# Patient Record
Sex: MALE | Race: WHITE | Employment: FULL TIME | URBAN - NONMETROPOLITAN AREA
[De-identification: names, ages, dates, MRNs, and addresses within clinical notes are randomized per-mention and may not be internally consistent; named-entity substitution may affect disease eponyms.]

---

## 2017-12-11 ENCOUNTER — HOSPITAL ENCOUNTER (EMERGENCY)
Facility: HOSPITAL | Age: 37
Discharge: HOME | End: 2017-12-11
Attending: PHYSICIAN ASSISTANT | Admitting: PHYSICIAN ASSISTANT
Payer: COMMERCIAL

## 2017-12-11 ENCOUNTER — APPOINTMENT (OUTPATIENT)
Dept: CT IMAGING | Facility: HOSPITAL | Age: 37
End: 2017-12-11
Attending: PHYSICIAN ASSISTANT
Payer: COMMERCIAL

## 2017-12-11 VITALS
TEMPERATURE: 98 F | HEART RATE: 81 BPM | OXYGEN SATURATION: 98 % | SYSTOLIC BLOOD PRESSURE: 120 MMHG | DIASTOLIC BLOOD PRESSURE: 79 MMHG | RESPIRATION RATE: 16 BRPM

## 2017-12-11 DIAGNOSIS — R19.7 DIARRHEA, UNSPECIFIED TYPE: ICD-10-CM

## 2017-12-11 DIAGNOSIS — K64.4 EXTERNAL HEMORRHOIDS: ICD-10-CM

## 2017-12-11 DIAGNOSIS — R10.84 ABDOMINAL PAIN, GENERALIZED: ICD-10-CM

## 2017-12-11 LAB
ALBUMIN SERPL-MCNC: 4.5 G/DL (ref 3.4–5)
ALP SERPL-CCNC: 81 U/L (ref 40–150)
ALT SERPL W P-5'-P-CCNC: 25 U/L (ref 0–70)
ANION GAP SERPL CALCULATED.3IONS-SCNC: 10 MMOL/L (ref 3–14)
AST SERPL W P-5'-P-CCNC: 17 U/L (ref 0–45)
BASOPHILS # BLD AUTO: 0 10E9/L (ref 0–0.2)
BASOPHILS NFR BLD AUTO: 0.4 %
BILIRUB SERPL-MCNC: 1.2 MG/DL (ref 0.2–1.3)
BUN SERPL-MCNC: 23 MG/DL (ref 7–30)
CALCIUM SERPL-MCNC: 9.3 MG/DL (ref 8.5–10.1)
CHLORIDE SERPL-SCNC: 102 MMOL/L (ref 94–109)
CO2 SERPL-SCNC: 24 MMOL/L (ref 20–32)
CREAT SERPL-MCNC: 0.89 MG/DL (ref 0.66–1.25)
CRP SERPL-MCNC: <2.9 MG/L (ref 0–8)
DIFFERENTIAL METHOD BLD: ABNORMAL
EOSINOPHIL # BLD AUTO: 0.1 10E9/L (ref 0–0.7)
EOSINOPHIL NFR BLD AUTO: 0.4 %
ERYTHROCYTE [DISTWIDTH] IN BLOOD BY AUTOMATED COUNT: 12.2 % (ref 10–15)
GFR SERPL CREATININE-BSD FRML MDRD: >90 ML/MIN/1.7M2
GLUCOSE SERPL-MCNC: 112 MG/DL (ref 70–99)
HCT VFR BLD AUTO: 49.4 % (ref 40–53)
HEMOCCULT STL QL IA: NEGATIVE
HGB BLD-MCNC: 17.4 G/DL (ref 13.3–17.7)
IMM GRANULOCYTES # BLD: 0 10E9/L (ref 0–0.4)
IMM GRANULOCYTES NFR BLD: 0.4 %
LIPASE SERPL-CCNC: 120 U/L (ref 73–393)
LYMPHOCYTES # BLD AUTO: 3.4 10E9/L (ref 0.8–5.3)
LYMPHOCYTES NFR BLD AUTO: 30.6 %
MCH RBC QN AUTO: 32 PG (ref 26.5–33)
MCHC RBC AUTO-ENTMCNC: 35.2 G/DL (ref 31.5–36.5)
MCV RBC AUTO: 91 FL (ref 78–100)
MONOCYTES # BLD AUTO: 0.9 10E9/L (ref 0–1.3)
MONOCYTES NFR BLD AUTO: 8.3 %
NEUTROPHILS # BLD AUTO: 6.7 10E9/L (ref 1.6–8.3)
NEUTROPHILS NFR BLD AUTO: 59.9 %
NRBC # BLD AUTO: 0 10*3/UL
NRBC BLD AUTO-RTO: 0 /100
PLATELET # BLD AUTO: 227 10E9/L (ref 150–450)
POTASSIUM SERPL-SCNC: 3.6 MMOL/L (ref 3.4–5.3)
PROT SERPL-MCNC: 7.8 G/DL (ref 6.8–8.8)
RBC # BLD AUTO: 5.43 10E12/L (ref 4.4–5.9)
SODIUM SERPL-SCNC: 136 MMOL/L (ref 133–144)
WBC # BLD AUTO: 11.2 10E9/L (ref 4–11)

## 2017-12-11 PROCEDURE — 25000128 H RX IP 250 OP 636: Performed by: RADIOLOGY

## 2017-12-11 PROCEDURE — 74177 CT ABD & PELVIS W/CONTRAST: CPT | Mod: TC

## 2017-12-11 PROCEDURE — 85025 COMPLETE CBC W/AUTO DIFF WBC: CPT | Performed by: PHYSICIAN ASSISTANT

## 2017-12-11 PROCEDURE — 96361 HYDRATE IV INFUSION ADD-ON: CPT

## 2017-12-11 PROCEDURE — 25000128 H RX IP 250 OP 636: Performed by: PHYSICIAN ASSISTANT

## 2017-12-11 PROCEDURE — 86140 C-REACTIVE PROTEIN: CPT | Performed by: PHYSICIAN ASSISTANT

## 2017-12-11 PROCEDURE — 80053 COMPREHEN METABOLIC PANEL: CPT | Performed by: PHYSICIAN ASSISTANT

## 2017-12-11 PROCEDURE — 83690 ASSAY OF LIPASE: CPT | Performed by: PHYSICIAN ASSISTANT

## 2017-12-11 PROCEDURE — 36415 COLL VENOUS BLD VENIPUNCTURE: CPT | Performed by: PHYSICIAN ASSISTANT

## 2017-12-11 PROCEDURE — 82274 ASSAY TEST FOR BLOOD FECAL: CPT | Performed by: PHYSICIAN ASSISTANT

## 2017-12-11 PROCEDURE — 99284 EMERGENCY DEPT VISIT MOD MDM: CPT | Performed by: PHYSICIAN ASSISTANT

## 2017-12-11 PROCEDURE — 99285 EMERGENCY DEPT VISIT HI MDM: CPT | Mod: 25

## 2017-12-11 PROCEDURE — 96374 THER/PROPH/DIAG INJ IV PUSH: CPT | Mod: 59

## 2017-12-11 RX ORDER — ONDANSETRON 4 MG/1
4 TABLET, ORALLY DISINTEGRATING ORAL EVERY 8 HOURS PRN
Qty: 10 TABLET | Refills: 0 | Status: SHIPPED | OUTPATIENT
Start: 2017-12-11 | End: 2017-12-14

## 2017-12-11 RX ORDER — ONDANSETRON 2 MG/ML
4 INJECTION INTRAMUSCULAR; INTRAVENOUS
Status: COMPLETED | OUTPATIENT
Start: 2017-12-11 | End: 2017-12-11

## 2017-12-11 RX ORDER — IOPAMIDOL 612 MG/ML
100 INJECTION, SOLUTION INTRAVASCULAR ONCE
Status: COMPLETED | OUTPATIENT
Start: 2017-12-11 | End: 2017-12-11

## 2017-12-11 RX ADMIN — IOPAMIDOL 100 ML: 612 INJECTION, SOLUTION INTRAVENOUS at 12:49

## 2017-12-11 RX ADMIN — SODIUM CHLORIDE 1000 ML: 9 INJECTION, SOLUTION INTRAVENOUS at 11:57

## 2017-12-11 RX ADMIN — ONDANSETRON 4 MG: 2 INJECTION INTRAMUSCULAR; INTRAVENOUS at 11:57

## 2017-12-11 ASSESSMENT — ENCOUNTER SYMPTOMS
DIARRHEA: 0
ABDOMINAL PAIN: 1
FEVER: 0
NEUROLOGICAL NEGATIVE: 1
NAUSEA: 1
FATIGUE: 1
CHILLS: 0
HEMATURIA: 0
FLANK PAIN: 0
VOMITING: 0
DIFFICULTY URINATING: 0
CARDIOVASCULAR NEGATIVE: 1
PSYCHIATRIC NEGATIVE: 1
FREQUENCY: 0
RECTAL PAIN: 1
DYSURIA: 0
BACK PAIN: 0

## 2017-12-11 NOTE — ED NOTES
"Pt ambulatory with a cane to ED room 1. Pt has c/o rectal pain. Pt states that he has a rectal hernia and \"now there is something sticking out of my rectum.\" Pt also c/o nausea and LLQ pain. Pt denies vomiting. Last BM was approx 2 days ago. Pt denies dysuria  "

## 2017-12-11 NOTE — DISCHARGE INSTRUCTIONS
"- Start with topical for hemorrhoid. This will help with swelling and pain.   - Sitz baths\" essentially soaking for 20 minutes in warm bath water  - FOLLOW UP with primary care to recheck and possibly set you up for further imaging. The CT scan is negative today.   - Monitor your symptoms to check for any triggers or patterns. Return here prior to family practice with any concern for worsening.     What to expect when you have contrast    During your exam, we will inject  contrast  into your vein or artery. (Contrast is a clear liquid with iodine in it. It shows up on X-rays.)    You may feel warm or hot. You may have a metal taste in your mouth and a slight upset stomach. You may also feel pressure near the kidneys and bladder. These effects will last about 1 to 3 minutes.    Please tell us if you have:    Sneezing     Itching    Hives     Swelling in the face    A hoarse voice    Breathing problems    Other new symptoms    Serious problems are rare.  They may include:    Irregular heartbeat     Seizures    Kidney failure              Tissue damage    Shock      Death    If you have any problems during the exam, we  will treat them right away.    When you get home    Call your hospital if you have any new symptoms in the next 2 days, like hives or swelling. (Phone numbers are at the bottom of this page.) Or call your family doctor.     If you have wheezing or trouble breathing, call 911.    Self-care  -Drink at least 4 extra glasses of water today.   This reduces the stress on your kidneys.  -Keep taking your regular medicines.    The contrast will pass out of your body in your  Urine(pee). This will happen in the next 24 hours. You  will not feel this. Your urine will not  change color.    If you have kidney problems or take metformin    Drink 4 to 8 large glasses of water for the next  2 days, if you are not on a fluid restriction.    ?If you take metformin (Glucophage or Glucovance) for diabetes, keep taking " it.      ?Your kidney function tests are abnormal.  If you take Metformin, do not take it for 48 hours. Please go to your clinic for a blood test within 3 days after your exam before the restarting this medicine.     (Note to provider:please give patient prescription for lab tests.)    ?Special instructions: drink four extra glasses of water today    I have read and understand the above information.    Patient Sign Here:______________________________________Date:________Time:______    Staff Sign Here:________________________________________Date:_______Time:______      Radiology Departments:     ?[facility]: [phone] ?[facility]: [phone]     ?[facility]: [phone] ?[facility]:[phone]      ?[facility]: [phone]  ?[facility]: [phone]  ?[place]:[phone]    ?[facility]:[phone]  ?[facility]:[phone]

## 2017-12-11 NOTE — ED NOTES
Arrived to triage with c/o pain in rectum for 2 years. States has been unable to see a doctor for it. States he googled all of his symptoms last night and it all points to rectal hernia. Denies fevers. States he is wasting away due to this. Rates pain 9/10.

## 2017-12-11 NOTE — LETTER
[address] [address]  Main: [phone]  Dept: [phone]      December 11, 2017      Re: Patel Anderson      TO WHOM IT MAY CONCERN:    Patel Anderson was seen in the emergency department today for acute illness. He has started treatment. I expect his condition to improve over the next 2-5 days. He may return to work when symptoms have improved.          Sincerely,      Pelon Figueroa PA-C   12/11/2017   1:41 PM

## 2017-12-11 NOTE — ED NOTES
Discharge instructions reviewed with patient, and patient verbalized understanding. Rx x2 sent to [facility]. Pt ambulated with a steady gait to the exit.

## 2017-12-11 NOTE — ED PROVIDER NOTES
History     Chief Complaint   Patient presents with     Hernia     states has had rectal hernia for 2 years and now it is causing pain.     The history is provided by the patient. No  was used.     Patel Anderson is a 37 year old male who presents with worsening abdominal pain over the past week.  He states he has been dealing with GI symptoms for almost 2 years and reports weight loss of 80 lbs over this time. Today he has hemorrhoid that the thinks may or may not be related to his belly pain and recent diarrhea. Worsening pain prompting visit today. He is also concerned as he has been tired and it is tough with the hemorrhoid pain to get through his second job past few days. In addition, he reports early satiety with meals. He is nauseated at times, but denies vomiting. He denies blood in vomit and no bloody or black stools. No LUTS. He has not had a surgery.     Problem List:    There are no active problems to display for this patient.       Past Medical History:    No past medical history on file.    Past Surgical History:    No past surgical history on file.    Family History:    No family history on file.    Social History:  Marital Status:   [2]  Social History   Substance Use Topics     Smoking status: Not on file     Smokeless tobacco: Not on file     Alcohol use Not on file        Medications:      hydrocortisone (ANUSOL-HC) 2.5 % cream   ondansetron (ZOFRAN ODT) 4 MG ODT tab         Review of Systems   Constitutional: Positive for fatigue. Negative for chills and fever.   Cardiovascular: Negative.    Gastrointestinal: Positive for abdominal pain, nausea and rectal pain. Negative for diarrhea (resolved) and vomiting.   Genitourinary: Negative for difficulty urinating, dysuria, flank pain, frequency, hematuria and urgency.   Musculoskeletal: Negative for back pain.   Skin: Negative.    Neurological: Negative.    Psychiatric/Behavioral: Negative.        Physical Exam   BP:  134/95  Pulse: 81  Heart Rate: (!) 122  Temp: 98  F (36.7  C)  Resp: 17  SpO2: 96 %      Physical Exam   Constitutional: He is oriented to person, place, and time. He appears well-developed and well-nourished. No distress (pt is thin appearing).   HENT:   Head: Normocephalic and atraumatic.   Right Ear: External ear normal.   Left Ear: External ear normal.   Mouth/Throat: Oropharynx is clear and moist.   Eyes: Conjunctivae are normal.   Cardiovascular: Normal heart sounds.    No murmur heard.  Pulmonary/Chest: Effort normal and breath sounds normal.   Abdominal: Soft. Bowel sounds are normal. There is tenderness.       Genitourinary: Rectal exam shows external hemorrhoid. Rectal exam shows no mass and anal tone normal.   Neurological: He is alert and oriented to person, place, and time.   Skin: Skin is warm and dry.   Psychiatric: He has a normal mood and affect.   Nursing note and vitals reviewed.      ED Course     ED Course     Procedures      Labs Ordered and Resulted from Time of ED Arrival Up to the Time of Departure from the ED   CBC WITH PLATELETS DIFFERENTIAL - Abnormal; Notable for the following:        Result Value    WBC 11.2 (*)     All other components within normal limits   COMPREHENSIVE METABOLIC PANEL - Abnormal; Notable for the following:     Glucose 112 (*)     All other components within normal limits   LIPASE   CRP INFLAMMATION   OCCULT BLOOD FECAL HGB IMMUNO     PROCEDURE: CT ABDOMEN PELVIS W CONTRAST 12/11/2017 12:57 PM     HISTORY: abdominal pain, weight loss;      COMPARISONS: None.     Meds/Dose Given: ISOVUE 300 / 100ml     TECHNIQUE: CT scan of the abdomen and pelvis with IV contrast.  Sagittal coronal reconstructions were obtained     FINDINGS: Lung bases are clear. Is a small cysts in height in the dome  of the liver on the right. No calcified gallstones are noted. There is  no biliary ductal enlargement. Spleen and pancreas appear normal.  There are no adrenal masses. The right left  "kidneys show no evidence  of masses or hydronephrosis. The periaortic lymph nodes are normal in  caliber. In the pelvis the bladder prostate and rectum appear normal.  No pelvic masses or fluid collections are noted. Intraperitoneally  there are no masses or inflammatory changes.          IMPRESSION: Negative CT scan of the abdomen and pelvis aside from a  small cyst in the liver     VALENTINA MENA MD    Medications   ondansetron (ZOFRAN) injection 4 mg (4 mg Intravenous Given 12/11/17 1157)   0.9% sodium chloride BOLUS (0 mLs Intravenous Stopped 12/11/17 1332)   iopamidol (ISOVUE-300) IV solution 61% 100 mL (100 mLs Intravenous Given 12/11/17 1249)   sodium chloride (PF) 0.9% PF flush 60 mL (60 mLs Intravenous Given 12/11/17 1249)       Assessments & Plan (with Medical Decision Making)     I have reviewed the nursing notes.  I have reviewed the findings, diagnosis, plan and need for follow up with the patient.    Discharge Medication List as of 12/11/2017  1:40 PM      START taking these medications    Details   hydrocortisone (ANUSOL-HC) 2.5 % cream Place rectally 2 times daily for 10 daysDisp-30 g, [id number]      ondansetron (ZOFRAN ODT) 4 MG ODT tab Take 1 tablet (4 mg) by mouth every 8 hours as needed for nausea, Disp-10 tablet, R-0, E-Prescribe             Final diagnoses:   Diarrhea, unspecified type   External hemorrhoids   Abdominal pain, generalized   Due to the diarrhea, abdominal pain and weight loss a CT is ordered and negative. He does have obvious hemorrhoid on exam and will start with conservative treatment as above. He adds that the zofran has \"helped me feel hungry for the first time in a while\", so this is prescribed for short term use until he can see his primary for follow up Friday/Monday. I stressed the importance of f/u and discussed differentials with patient considering his reported Hx. He is agreeable to plan. Will return here with any persistent diarrhea or vomiting, bloody stools, " fevers, concern for worsening despite treatment.     12/11/2017   [facility]     Pelon Figueroa, PA  12/12/17 0115

## 2017-12-11 NOTE — ED AVS SNAPSHOT
[facility]    [address] [address]    Phone:  [phone]                                       Patel Anderson   MRN: [id number]    Department:  [facility]   Date of Visit:  12/11/2017           After Visit Summary Signature Page     I have received my discharge instructions, and my questions have been answered. I have discussed any challenges I see with this plan with the nurse or doctor.    ..........................................................................................................................................  Patient/Patient Representative Signature      ..........................................................................................................................................  Patient Representative Print Name and Relationship to Patient    ..................................................               ................................................  Date                                            Time    ..........................................................................................................................................  Reviewed by Signature/Title    ...................................................              ..............................................  Date                                                            Time

## 2017-12-11 NOTE — ED AVS SNAPSHOT
[facility]    [address] [address]    Phone:  [phone]                                       Patel Anderson   MRN: [id number]    Department:  [facility]   Date of Visit:  12/11/2017           Patient Information     Date Of Birth          1980        Your diagnoses for this visit were:     Diarrhea, unspecified type     External hemorrhoids     Abdominal pain, generalized        You were seen by Pelon Figueroa PA.      Follow-up Information     Follow up with Scooby Keane MD.    Specialty:  Family Practice    Why:  3-5 days    Contact information:    [facility]  [address] [address]  [phone]          Follow up with [facility].    Specialty:  EMERGENCY MEDICINE    Why:  If symptoms worsen    Contact information:    [address] [address] [phone]    Additional information:    From [place]: Take [address]. Turn left at [address]. Turn left at the first stoplight on [address] [place] [place] [address]. At the first stop sign, take a right onto [address]. Take a left into the parking lot and continue through until you reach the North enterance of the building.       From [place]: Take [address]. Take the [address] ramp towards [place]. Turn left onto [address]. Take a slight right onto [address]. Turn left at the first stoplight on [address]. At the first stop sign, take a right onto [address]. Take a left into the parking lot and continue through until you reach the North enterance of the building.       From [place]: Take [address]. Take a right at [address]. At the first stop sign, take a right onto [address]. Take a left into the parking lot and continue through until you reach the North enterance of the building.         Discharge Instructions       - Start with topical for hemorrhoid. This will help  "with swelling and pain.   - Sitz baths\" essentially soaking for 20 minutes in warm bath water  - FOLLOW UP with primary care to recheck and possibly set you up for further imaging. The CT scan is negative today.   - Monitor your symptoms to check for any triggers or patterns. Return here prior to family practice with any concern for worsening.     What to expect when you have contrast    During your exam, we will inject  contrast  into your vein or artery. (Contrast is a clear liquid with iodine in it. It shows up on X-rays.)    You may feel warm or hot. You may have a metal taste in your mouth and a slight upset stomach. You may also feel pressure near the kidneys and bladder. These effects will last about 1 to 3 minutes.    Please tell us if you have:    Sneezing     Itching    Hives     Swelling in the face    A hoarse voice    Breathing problems    Other new symptoms    Serious problems are rare.  They may include:    Irregular heartbeat     Seizures    Kidney failure              Tissue damage    Shock      Death    If you have any problems during the exam, we  will treat them right away.    When you get home    Call your hospital if you have any new symptoms in the next 2 days, like hives or swelling. (Phone numbers are at the bottom of this page.) Or call your family doctor.     If you have wheezing or trouble breathing, call 911.    Self-care  -Drink at least 4 extra glasses of water today.   This reduces the stress on your kidneys.  -Keep taking your regular medicines.    The contrast will pass out of your body in your  Urine(pee). This will happen in the next 24 hours. You  will not feel this. Your urine will not  change color.    If you have kidney problems or take metformin    Drink 4 to 8 large glasses of water for the next  2 days, if you are not on a fluid restriction.    ?If you take metformin (Glucophage or Glucovance) for diabetes, keep taking it.      ?Your kidney function tests are abnormal.  If " you take Metformin, do not take it for 48 hours. Please go to your clinic for a blood test within 3 days after your exam before the restarting this medicine.     (Note to provider:please give patient prescription for lab tests.)    ?Special instructions: drink four extra glasses of water today    I have read and understand the above information.    Patient Sign Here:______________________________________Date:________Time:______    Staff Sign Here:________________________________________Date:_______Time:______      Radiology Departments:     ?[facility]: [phone] ?[facility]: [phone]     ?[facility]: [phone] ?[facility]:[phone]      ?[place]: [phone]  ?[facility]: [phone]  ?[facility]:[phone]    ?[facility]:[phone]  ?[facility]:[phone]    Discharge References/Attachments     HEMORRHOIDS (ENGLISH)    ABDOMINAL PAIN, UNKOWN CAUSE, (MALE) (ENGLISH)         Review of your medicines      START taking        Dose / Directions Last dose taken    hydrocortisone 2.5 % cream   Commonly known as:  ANUSOL-HC   Quantity:  30 g        Place rectally 2 times daily for 10 days   Refills:  0        ondansetron 4 MG ODT tab   Commonly known as:  ZOFRAN ODT   Dose:  4 mg   Quantity:  10 tablet        Take 1 tablet (4 mg) by mouth every 8 hours as needed for nausea   Refills:  0                Prescriptions were sent or printed at these locations (2 Prescriptions)                   [id number] - [place] - [address] [id number] [address] [id number]    Telephone:  [phone]   Fax:  [phone]   Hours:                  E-Prescribed (2 of 2)         hydrocortisone (ANUSOL-HC) 2.5 % cream               ondansetron (ZOFRAN ODT) 4 MG ODT tab                Procedures and tests performed during your visit     CBC with platelets differential    CRP inflammation    CT Abdomen Pelvis w Contrast    Comprehensive metabolic panel    Lipase    Occult blood fecal HGB immuno      Orders  "Needing Specimen Collection     Ordered          17 1142  Clostridium difficile toxin B PCR - ROUTINE, Prio: Routine, Needs to be Collected     Scheduled Task Status   17 1143 Collect Clostridium difficile toxin B PCR Open   Order Class:  PCU Collect                17 1143  UA reflex to Microscopic - STAT, Prio: STAT, Needs to be Collected     Scheduled Task Status   17 1143 Collect UA reflex to Microscopic Open   Order Class:  PCU Collect                  Pending Results     No orders found from 2017 to 2017.            Pending Culture Results     No orders found from 2017 to 2017.            Thank you for choosing [facility]       Thank you for choosing [facility] for your care. Our goal is always to provide you with excellent care. Hearing back from our patients is one way we can continue to improve our services. Please take a few minutes to complete the written survey that you may receive in the mail after you visit with us. Thank you!        [facility] Information     [facility] lets you send messages to your doctor, view your test results, renew your prescriptions, schedule appointments and more. To sign up, go to [email or web address] . Click on \"Log in\" on the left side of the screen, which will take you to the Welcome page. Then click on \"Sign up Now\" on the right side of the page.     You will be asked to enter the access code listed below, as well as some personal information. Please follow the directions to create your username and password.     Your access code is: [email or web address]  Expires: 3/11/2018  1:32 PM     Your access code will  in 90 days. If you need help or a new code, please call your [facility] clinic or [phone].        Care EveryWhere ID     This is your Care EveryWhere ID. This could be used by other organizations to access your [facility] medical records  [id number]        Equal Access to Services     CHEIKH JOSHUA: Carey archuleta " adam Bull, nelia liceamaaiden maradiaga. So [facility] [phone].    ATENCIÓN: Si habla español, tiene a zamudio disposición servicios gratuitos de asistencia lingüística. Llame al [phone].    We comply with applicable federal civil rights laws and [place] laws. We do not discriminate on the basis of race, color, national origin, age, disability, sex, sexual orientation, or gender identity.            After Visit Summary       This is your record. Keep this with you and show to your community pharmacist(s) and doctor(s) at your next visit.

## 2017-12-14 ENCOUNTER — TRANSFERRED RECORDS (OUTPATIENT)
Dept: HEALTH INFORMATION MANAGEMENT | Facility: HOSPITAL | Age: 37
End: 2017-12-14

## 2017-12-14 LAB
HBA1C MFR BLD: 5.4 % (ref 4–6)
TSH SERPL-ACNC: 1.63 MIU/ML (ref 0.35–4.8)

## 2017-12-19 ENCOUNTER — OFFICE VISIT (OUTPATIENT)
Dept: SURGERY | Facility: OTHER | Age: 37
End: 2017-12-19
Attending: SURGERY
Payer: COMMERCIAL

## 2017-12-19 VITALS
BODY MASS INDEX: 19.46 KG/M2 | HEART RATE: 76 BPM | SYSTOLIC BLOOD PRESSURE: 116 MMHG | DIASTOLIC BLOOD PRESSURE: 70 MMHG | HEIGHT: 69 IN | OXYGEN SATURATION: 96 % | WEIGHT: 131.4 LBS | TEMPERATURE: 97.8 F

## 2017-12-19 DIAGNOSIS — R63.4 LOSS OF WEIGHT: ICD-10-CM

## 2017-12-19 DIAGNOSIS — N41.0 ACUTE PROSTATITIS: Primary | ICD-10-CM

## 2017-12-19 DIAGNOSIS — Z01.818 ENCOUNTER FOR PREOPERATIVE EXAMINATION FOR GENERAL SURGICAL PROCEDURE: ICD-10-CM

## 2017-12-19 DIAGNOSIS — R10.32 ABDOMINAL PAIN, LEFT LOWER QUADRANT: ICD-10-CM

## 2017-12-19 LAB
ALBUMIN UR-MCNC: 10 MG/DL
APPEARANCE UR: ABNORMAL
BACTERIA #/AREA URNS HPF: ABNORMAL /HPF
BILIRUB UR QL STRIP: NEGATIVE
COLOR UR AUTO: YELLOW
GLUCOSE UR STRIP-MCNC: NEGATIVE MG/DL
HGB UR QL STRIP: NEGATIVE
KETONES UR STRIP-MCNC: NEGATIVE MG/DL
LEUKOCYTE ESTERASE UR QL STRIP: NEGATIVE
MUCOUS THREADS #/AREA URNS LPF: PRESENT /LPF
NITRATE UR QL: NEGATIVE
PH UR STRIP: 5 PH (ref 4.7–8)
PSA SERPL-ACNC: 0.5 UG/L (ref 0–4)
RBC #/AREA URNS AUTO: 3 /HPF (ref 0–2)
SOURCE: ABNORMAL
SP GR UR STRIP: 1.02 (ref 1–1.03)
UROBILINOGEN UR STRIP-MCNC: NORMAL MG/DL (ref 0–2)
WBC #/AREA URNS AUTO: 1 /HPF (ref 0–2)

## 2017-12-19 PROCEDURE — 81001 URINALYSIS AUTO W/SCOPE: CPT | Mod: ZL | Performed by: SURGERY

## 2017-12-19 PROCEDURE — 93005 ELECTROCARDIOGRAM TRACING: CPT

## 2017-12-19 PROCEDURE — G0103 PSA SCREENING: HCPCS | Mod: ZL | Performed by: SURGERY

## 2017-12-19 PROCEDURE — 99213 OFFICE O/P EST LOW 20 MIN: CPT

## 2017-12-19 PROCEDURE — 99204 OFFICE O/P NEW MOD 45 MIN: CPT | Performed by: SURGERY

## 2017-12-19 PROCEDURE — 93010 ELECTROCARDIOGRAM REPORT: CPT | Performed by: INTERNAL MEDICINE

## 2017-12-19 RX ORDER — SULFAMETHOXAZOLE/TRIMETHOPRIM 800-160 MG
1 TABLET ORAL 2 TIMES DAILY
Qty: 20 TABLET | Refills: 0 | Status: SHIPPED | OUTPATIENT
Start: 2017-12-19 | End: 2020-09-19

## 2017-12-19 RX ORDER — ONDANSETRON 4 MG/1
4 TABLET, FILM COATED ORAL EVERY 8 HOURS PRN
COMMUNITY
End: 2020-09-19

## 2017-12-19 RX ORDER — BISACODYL 5 MG/1
TABLET, DELAYED RELEASE ORAL
Qty: 8 TABLET | Refills: 0 | Status: CANCELLED | OUTPATIENT
Start: 2017-12-19

## 2017-12-19 RX ORDER — POLYETHYLENE GLYCOL 3350 17 G/17G
POWDER, FOR SOLUTION ORAL
Qty: 1 BOTTLE | Refills: 0 | Status: CANCELLED | OUTPATIENT
Start: 2017-12-19

## 2017-12-19 ASSESSMENT — PAIN SCALES - GENERAL: PAINLEVEL: NO PAIN (0)

## 2017-12-19 NOTE — PATIENT INSTRUCTIONS
Your procedure will be at the [facility] in [place]  [address] [facility] [address]  [place] () [phone]  Fax Number [phone]      Thank you for allowing Dr. Sweeney and our surgical team to participate in your care.  If you have a scheduling or an appointment question please contact [facility] Health Unit Coordinator at her direct line [phone].   ALL nursing questions or concerns can be directed to Elsie at: [phone]     You are scheduled for a: colonoscopy and egd  Your procedure date is: 1/4/18    You need a friend or family member available to drive you home AND stay with you for 24 hours after you leave the hospital. You will not be allowed to drive yourself. IF you need to take a taxi or the bus you MUST have a responsible person to ride with you. YOUR PROCEDURE WILL BE CANCELLED IF YOU DO NOT HAVE A RESPONSIBLE ADULT TO DRIVE YOU HOME.       You CANNOT have anything to eat or drink after midnight the night before your surgery, ncluding water and coffee. Your stomach needs to be completely empty. Do NOT chew gum, suck on hard candy, or smoke. You can brush your teeth the morning of surgery.       You need to call our Surgery Education Nurses 1-2 weeks prior to your surgery date at  [phone] or toll free [phone]. Please have you medication and allergy lists ready.      Stop your aspirin or other NSAIDs(Ibuprofen, Motrin, Aleve, Celebrex, Naproxen, etc...) 7 days before your surgery.      Hospital admitting will call you the day before your surgery with your arrival time. If you are scheduled on a Monday admitting will call you the Friday before.      Please call your primary care physician if you should become ill within 24 hours of scheduled surgery. (ex.vomiting, diarrhea, fever)    Surgery Education will contact you the day before your procedure between the hours of noon and 5 pm with the time you need to register in admitting at the hospital. Call  Elsie with any questions [phone]  Hold all medications day of surgery and once you return home from your procedure you may resume all medications like normal.

## 2017-12-19 NOTE — PROGRESS NOTES
[facility] Surgery Consultation    CC:  Weight loss and abdominal pain    HPI:  This 37 year old year old male is seen at the request of Luna Castro for evaluation of abdominal pain and weight loss.  The history is obtained from the patient, and reviewing the medical record.  He is good medical historian. Mr. Anderson is a 37 year old male who presents with an 18 month history of weight loss and abdominal pain. He says that the pain is in the left lower quadrant. He has had nausea associated with the pain but no emesis. He has a history of constipation where he would go days without bowel movements and problems with hemorrhoids. He has noticed that recently he has had a bulge/hemorrhoid where he has had to push it back in in the recent past. However, more recently he has had problems with it popping out and would not go back in. He has noticed some blood on the toilet paper and in the bowel. He states that with the hemorrhoid he will have pain in the rectum. He was recently seen in the emergency room for his left lower quadrant abdominal pain. At that time he was evaluated with labs and imaging. He had a mild leukocytosis of 11.2 and his CT scan was within normal limits with no abnormalities noted. He has not food fear, he has no upper gastrointestinal symptoms, he typically will have one bowel movement a day.     No past medical history on file.    No past surgical history on file.    Pt denied problems with bleeding or anesthesia    Current Outpatient Prescriptions   Medication Sig Dispense Refill     ondansetron (ZOFRAN) 4 MG tablet Take 4 mg by mouth every 8 hours as needed for nausea       OMEPRAZOLE PO Take 40 mg by mouth every morning       hydrocortisone (ANUSOL-HC) 2.5 % cream Place rectally 2 times daily for 10 days 30 g 0        Not on File      HABITS:    Social History   Substance Use Topics     Smoking status: Not on file     Smokeless tobacco: Not on file     Alcohol use Not on file     No  "mood altering drug use.    No family history on file.    REVIEW OF SYSTEMS:  Ten point review of systems negative except those mentioned in the HPI.     The patient denies sleep apnea, latex allergies or MRSA    OBJECTIVE:    /70  Pulse 76  Temp 97.8  F (36.6  C) (Tympanic)  Ht 5' 9\" (1.753 m)  Wt 131 lb 6.4 oz (59.6 kg)  SpO2 96%  BMI 19.4 kg/m2    GENERAL: Generally appears cachectic, he is weak with depressed affect.  HEENT:   Sclerae anicteric - No cervical, supra/infraclavicular lymphadenopathy, no thyroid masses  Respiratory:  Lungs clear to ausculation bilaterally with good air excursion  Cardiovascular:  Regular Rate and Rhythm with no murmurs gallops or rubs, normal   Abdomen: soft, mild left lower quadrant tenderness with no rebound or guarding  :  Deferred  Rectal: digital rectal exam was performed there was a right anterior external hemorrhoid with that is soft and not engorged, prostate was tender to the touch with. On anoscopy there was mixed internal and external hemorrhoids, there was no evidence of any fissures or inflammation  Extremities:  Extremities normal. No deformities, edema, or skin discoloration.  Skin:  no suspicious lesions or rashes  Neurological: grossly intact    Psych:  Alert, oriented, affect appropriate with normal decision making ability.      IMPRESSION:  37 year old male with weight loss, abdominal pain, and prostate tenderness    PLAN:  I discussed with the patient that at this time I am uncertain what could be causing his weight loss at this time. I would recommend that we perform and EGD and colonoscopy to identify any mucosal abnormalities. As he is having prostate tenderness I will send off a PSA and urine analysis with culture. If there is any abnormalities he will be notified. All questions and concerns were answered.    The indications, risks, benefits and technical aspects of whole colon colonoscopy were outlined with risks including, but not limited to, " perforation, bleeding and inability to visualize entire colon.  Management of each was reviewed.  The need of mechanical preparation of the colon was reviewed along with the use of monitored anesthetic care.  The patient's questions were asked and answered.  The indications, risks, benefits and technical aspects of EGD were reviewed with her questions asked and answered.  Antral biopsy for histologic examination will be performed and the place of H. pylori in gastritis was discussed.  Preoperative preparation, npo after midnight preceding the date was discussed and a request made to hold aspirin containing agents one week prior to ameliorate antiplatelet effect.  Questions asked and answered - will proceed based on scheduling availability.          Javy Sweeney MD    12/19/2017  3:46 PM    cc:  Scooby Dailey

## 2017-12-19 NOTE — NURSING NOTE
"Chief Complaint   Patient presents with     Consult     epigastric pain, llq, weight loss, referred by therese in ED        Initial /70  Pulse 76  Temp 97.8  F (36.6  C) (Tympanic)  Ht 5' 9\" (1.753 m)  Wt 131 lb 6.4 oz (59.6 kg)  SpO2 96%  BMI 19.4 kg/m2 Estimated body mass index is 19.4 kg/(m^2) as calculated from the following:    Height as of this encounter: 5' 9\" (1.753 m).    Weight as of this encounter: 131 lb 6.4 oz (59.6 kg).  Medication Reconciliation: complete     Elsie Kemp      "

## 2017-12-19 NOTE — MR AVS SNAPSHOT
After Visit Summary   12/19/2017    Patel Anderson    MRN: [id number]           Patient Information     Date Of Birth          1980        Visit Information        Provider Department      12/19/2017 1:00 PM Javy Sweeney MD [facility]        Today's Diagnoses     Prostatitis    -  1    Abdominal pain, left lower quadrant        Loss of weight        Encounter for preoperative examination for general surgical procedure          Care Instructions     Your procedure will be at the [facility] in [place]  [address] [facility] [address]  [place] () [phone]  Fax Number [phone]      Thank you for allowing Dr. Sweeney and our surgical team to participate in your care.  If you have a scheduling or an appointment question please contact [facility] Health Unit Coordinator at her direct line [phone].   ALL nursing questions or concerns can be directed to Elsie at: [phone]     You are scheduled for a: colonoscopy and egd  Your procedure date is: 1/4/18    You need a friend or family member available to drive you home AND stay with you for 24 hours after you leave the hospital. You will not be allowed to drive yourself. IF you need to take a taxi or the bus you MUST have a responsible person to ride with you. YOUR PROCEDURE WILL BE CANCELLED IF YOU DO NOT HAVE A RESPONSIBLE ADULT TO DRIVE YOU HOME.       You CANNOT have anything to eat or drink after midnight the night before your surgery, ncluding water and coffee. Your stomach needs to be completely empty. Do NOT chew gum, suck on hard candy, or smoke. You can brush your teeth the morning of surgery.       You need to call our Surgery Education Nurses 1-2 weeks prior to your surgery date at  [phone] or toll free [phone]. Please have you medication and allergy lists ready.      Stop your aspirin or other NSAIDs(Ibuprofen, Motrin, Aleve, Celebrex, Naproxen, etc...) 7 days before  "your surgery.      Hospital admitting will call you the day before your surgery with your arrival time. If you are scheduled on a Monday admitting will call you the Friday before.      Please call your primary care physician if you should become ill within 24 hours of scheduled surgery. (ex.vomiting, diarrhea, fever)    Surgery Education will contact you the day before your procedure between the hours of noon and 5 pm with the time you need to register in admitting at the hospital. Call Elsie with any questions [phone]  Hold all medications day of surgery and once you return home from your procedure you may resume all medications like normal.           Follow-ups after your visit        Who to contact     If you have questions or need follow up information about today's clinic visit or your schedule please contact [facility] directly at [phone].  Normal or non-critical lab and imaging results will be communicated to you by [employer], letter or phone within 4 business days after the clinic has received the results. If you do not hear from us within 7 days, please contact the clinic through [employer] or phone. If you have a critical or abnormal lab result, we will notify you by phone as soon as possible.  Submit refill requests through [employer] or call your pharmacy and they will forward the refill request to us. Please allow 3 business days for your refill to be completed.          Additional Information About Your Visit        [employer] Information     [employer] lets you send messages to your doctor, view your test results, renew your prescriptions, schedule appointments and more. To sign up, go to [email or web address] . Click on \"Log in\" on the left side of the screen, which will take you to the Welcome page. Then click on \"Sign up Now\" on the right side of the page.     You will be asked to enter the access code listed below, as well as some personal information. Please follow the directions to " "create your username and password.     Your access code is: [email or web address]  Expires: 3/11/2018  1:32 PM     Your access code will  in 90 days. If you need help or a new code, please call your [place] clinic or [phone].        Care EveryWhere ID     This is your Care EveryWhere ID. This could be used by other organizations to access your [facility] medical records  [id number]        Your Vitals Were     Pulse Temperature Height Pulse Oximetry BMI (Body Mass Index)       76 97.8  F (36.6  C) (Tympanic) 5' 9\" (1.753 m) 96% 19.4 kg/m2        Blood Pressure from Last 3 Encounters:   17 116/70   17 120/79    Weight from Last 3 Encounters:   17 131 lb 6.4 oz (59.6 kg)              We Performed the Following     EKG 12-lead complete w/read - Clinics     PSA, screen     UA reflex to Microscopic and Culture - [place]        Primary Care Provider Office Phone # Fax #    Scooby Keane [phone]       [facility] [address] [address]        Equal Access to Services     MICHELLE GREGORIO AH: Hadii aad ku hadasho Soomaali, waaxda luqadaha, qaybta kaalmada adeegyada, aiden pachecon olaf ward. So [facility] [phone].    ATENCIÓN: Si habla español, tiene a zamudio disposición servicios gratuitos de asistencia lingüística. Llame al [phone].    We comply with applicable federal civil rights laws and [place] laws. We do not discriminate on the basis of race, color, national origin, age, disability, sex, sexual orientation, or gender identity.            Thank you!     Thank you for choosing [facility]  for your care. Our goal is always to provide you with excellent care. Hearing back from our patients is one way we can continue to improve our services. Please take a few minutes to complete the written survey that you may receive in the mail after your visit with us. Thank you!             Your Updated Medication List - Protect others " around you: Learn how to safely use, store and throw away your medicines at www.disposemymeds.org.          This list is accurate as of: 12/19/17  2:34 PM.  Always use your most recent med list.                   Brand Name Dispense Instructions for use Diagnosis    hydrocortisone 2.5 % cream    ANUSOL-HC    30 g    Place rectally 2 times daily for 10 days        OMEPRAZOLE PO      Take 40 mg by mouth every morning        ZOFRAN 4 MG tablet   Generic drug:  ondansetron      Take 4 mg by mouth every 8 hours as needed for nausea

## 2017-12-20 ENCOUNTER — TELEPHONE (OUTPATIENT)
Dept: SURGERY | Facility: OTHER | Age: 37
End: 2017-12-20

## 2017-12-20 NOTE — TELEPHONE ENCOUNTER
Fax conformation from [facility] received on 12/19/17 at 5934 Amalia Chung LPN

## 2018-01-04 ENCOUNTER — TRANSFERRED RECORDS (OUTPATIENT)
Dept: HEALTH INFORMATION MANAGEMENT | Facility: HOSPITAL | Age: 38
End: 2018-01-04

## 2018-01-04 ENCOUNTER — APPOINTMENT (OUTPATIENT)
Dept: LAB | Facility: HOSPITAL | Age: 38
End: 2018-01-04
Attending: SURGERY
Payer: COMMERCIAL

## 2018-01-04 ENCOUNTER — RESULTS ONLY (OUTPATIENT)
Dept: LAB | Age: 38
End: 2018-01-04

## 2018-01-04 ENCOUNTER — OFFICE VISIT (OUTPATIENT)
Dept: ANESTHESIOLOGY | Facility: HOSPITAL | Age: 38
End: 2018-01-04
Attending: SURGERY
Payer: COMMERCIAL

## 2018-01-04 PROCEDURE — 45385 COLONOSCOPY W/LESION REMOVAL: CPT | Performed by: SURGERY

## 2018-01-04 PROCEDURE — 00813 ANES UPR LWR GI NDSC PX: CPT | Mod: QZ | Performed by: NURSE ANESTHETIST, CERTIFIED REGISTERED

## 2018-01-04 PROCEDURE — 88305 TISSUE EXAM BY PATHOLOGIST: CPT | Mod: TC | Performed by: SURGERY

## 2018-01-04 PROCEDURE — 45380 COLONOSCOPY AND BIOPSY: CPT | Mod: 59 | Performed by: SURGERY

## 2018-01-04 PROCEDURE — 43239 EGD BIOPSY SINGLE/MULTIPLE: CPT | Performed by: SURGERY

## 2018-01-04 PROCEDURE — 45381 COLONOSCOPY SUBMUCOUS NJX: CPT | Performed by: SURGERY

## 2018-01-08 LAB — COPATH REPORT: NORMAL

## 2018-01-17 ENCOUNTER — TELEPHONE (OUTPATIENT)
Dept: SURGERY | Facility: OTHER | Age: 38
End: 2018-01-17

## 2019-04-18 ENCOUNTER — OFFICE VISIT (OUTPATIENT)
Dept: CHIROPRACTIC MEDICINE | Facility: OTHER | Age: 39
End: 2019-04-18
Attending: CHIROPRACTOR
Payer: COMMERCIAL

## 2019-04-18 DIAGNOSIS — M99.03 SEGMENTAL AND SOMATIC DYSFUNCTION OF LUMBAR REGION: Primary | ICD-10-CM

## 2019-04-18 DIAGNOSIS — M99.02 SEGMENTAL AND SOMATIC DYSFUNCTION OF THORACIC REGION: ICD-10-CM

## 2019-04-18 DIAGNOSIS — M54.50 ACUTE BILATERAL LOW BACK PAIN WITHOUT SCIATICA: ICD-10-CM

## 2019-04-18 PROCEDURE — 98940 CHIROPRACT MANJ 1-2 REGIONS: CPT | Mod: AT | Performed by: CHIROPRACTOR

## 2019-04-18 PROCEDURE — 99201 ZZC OFFICE/OUTPT VISIT, NEW, LEVEL I: CPT | Mod: 25 | Performed by: CHIROPRACTOR

## 2019-04-18 NOTE — PROGRESS NOTES
Subjective Finding:    Chief compalint: Patient presents with:  Back Pain: sharp low back pain  , Pain Scale: 8/10, Intensity: sharp, Duration: 4 days, Radiating: no.    Date of injury:     Activities that the pain restricts:   Home/household/hobbies/social activities: yes.  Work duties: yes.  Sleep: yes.  Makes symptoms better: rest.  Makes symptoms worse: activity.  Have you seen anyone else for the symptoms? No.  Work related: no.  Automobile related injury: no.    Objective and Assessment:    Posture Analysis:   High shoulder: .  Head tilt: .  High iliac crest: right.  Head carriage: neutral.  Thoracic Kyphosis: neutral.  Lumbar Lordosis: forward.    Lumbar Range of Motion: extension decreased and right lateral flexion decreased.  Cervical Range of Motion: .  Thoracic Range of Motion: .  Extremity Range of Motion: .    Palpation:   Quad lumb: bilateral, referred pain: no    Segmental dysfunction pre-treatment and treatment area: T7, L5 and Sacrum.    Assessment post-treatment:  Cervical: .  Thoracic: ROM increased.  Lumbar: ROM increased.    Comments: .      Complicating Factors: .    Procedure(s):  CMT:  16987 Chiropractic manipulative treatment 1-2 regions performed   Thoracic: Diversified, See above for level, Supine and Lumbar: Diversified, See above for level, Prone    Modalities:  None performed this visit    Therapeutic procedures:  None    Plan:  Treatment plan: PRN.  Instructed patient: stretch as instructed at visit.  Short term goals: increase ROM.  Long term goals: restore normal function.  Prognosis: very good.

## 2019-06-28 ENCOUNTER — HOSPITAL ENCOUNTER (EMERGENCY)
Facility: HOSPITAL | Age: 39
Discharge: HOME | End: 2019-06-28
Attending: FAMILY MEDICINE | Admitting: FAMILY MEDICINE
Payer: COMMERCIAL

## 2019-06-28 VITALS
DIASTOLIC BLOOD PRESSURE: 90 MMHG | SYSTOLIC BLOOD PRESSURE: 115 MMHG | HEART RATE: 76 BPM | TEMPERATURE: 97.3 F | RESPIRATION RATE: 17 BRPM | OXYGEN SATURATION: 100 %

## 2019-06-28 DIAGNOSIS — Z00.8 MEDICAL CLEARANCE FOR INCARCERATION: ICD-10-CM

## 2019-06-28 PROCEDURE — 99285 EMERGENCY DEPT VISIT HI MDM: CPT

## 2019-06-28 PROCEDURE — 99282 EMERGENCY DEPT VISIT SF MDM: CPT | Mod: Z6 | Performed by: FAMILY MEDICINE

## 2019-06-28 ASSESSMENT — ENCOUNTER SYMPTOMS
PALPITATIONS: 0
NEUROLOGICAL NEGATIVE: 1
CHEST TIGHTNESS: 0
PSYCHIATRIC NEGATIVE: 1
ACTIVITY CHANGE: 0
ABDOMINAL PAIN: 0
APPETITE CHANGE: 0
HEMATOLOGIC/LYMPHATIC NEGATIVE: 1
CONSTIPATION: 0
SHORTNESS OF BREATH: 0
MUSCULOSKELETAL NEGATIVE: 1

## 2019-06-28 NOTE — ED AVS SNAPSHOT
[facility]  [address] [address]  Phone:  [phone]                                    Patel Anderson   MRN: [id number]    Department:  [facility]   Date of Visit:  6/28/2019           After Visit Summary Signature Page    I have received my discharge instructions, and my questions have been answered. I have discussed any challenges I see with this plan with the nurse or doctor.    ..........................................................................................................................................  Patient/Patient Representative Signature      ..........................................................................................................................................  Patient Representative Print Name and Relationship to Patient    ..................................................               ................................................  Date                                   Time    ..........................................................................................................................................  Reviewed by Signature/Title    ...................................................              ..............................................  Date                                               Time          22EPIC Rev 08/18

## 2019-06-28 NOTE — ED NOTES
Discharge instructions given. Verbalized understanding. Denies pain. Vitals stable as charted. correction release form signed by doctor and given to Lachelle from [place] police. Patient ambulated out of ED in hand cuffs in police custody.
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
## 2020-03-02 ENCOUNTER — HEALTH MAINTENANCE LETTER (OUTPATIENT)
Age: 40
End: 2020-03-02

## 2020-09-19 ENCOUNTER — HOSPITAL ENCOUNTER (EMERGENCY)
Facility: HOSPITAL | Age: 40
Discharge: HOME | End: 2020-09-19
Attending: NURSE PRACTITIONER | Admitting: NURSE PRACTITIONER
Payer: COMMERCIAL

## 2020-09-19 VITALS
HEART RATE: 108 BPM | SYSTOLIC BLOOD PRESSURE: 120 MMHG | OXYGEN SATURATION: 98 % | RESPIRATION RATE: 16 BRPM | TEMPERATURE: 98.6 F | DIASTOLIC BLOOD PRESSURE: 94 MMHG

## 2020-09-19 DIAGNOSIS — L02.512 ABSCESS OF FINGER OF LEFT HAND: ICD-10-CM

## 2020-09-19 PROCEDURE — 90471 IMMUNIZATION ADMIN: CPT

## 2020-09-19 PROCEDURE — 10060 I&D ABSCESS SIMPLE/SINGLE: CPT

## 2020-09-19 PROCEDURE — 10060 I&D ABSCESS SIMPLE/SINGLE: CPT | Mod: Z6 | Performed by: NURSE PRACTITIONER

## 2020-09-19 PROCEDURE — 40000268 ZZH STATISTIC NO CHARGES

## 2020-09-19 PROCEDURE — 90715 TDAP VACCINE 7 YRS/> IM: CPT | Performed by: NURSE PRACTITIONER

## 2020-09-19 PROCEDURE — 87186 SC STD MICRODIL/AGAR DIL: CPT | Performed by: NURSE PRACTITIONER

## 2020-09-19 PROCEDURE — 25000128 H RX IP 250 OP 636: Performed by: NURSE PRACTITIONER

## 2020-09-19 PROCEDURE — 96372 THER/PROPH/DIAG INJ SC/IM: CPT

## 2020-09-19 PROCEDURE — 87077 CULTURE AEROBIC IDENTIFY: CPT | Performed by: NURSE PRACTITIONER

## 2020-09-19 PROCEDURE — 87070 CULTURE OTHR SPECIMN AEROBIC: CPT | Performed by: NURSE PRACTITIONER

## 2020-09-19 PROCEDURE — 87205 SMEAR GRAM STAIN: CPT | Performed by: NURSE PRACTITIONER

## 2020-09-19 RX ORDER — CEFTRIAXONE SODIUM 1 G
1 VIAL (EA) INJECTION ONCE
Status: COMPLETED | OUTPATIENT
Start: 2020-09-19 | End: 2020-09-19

## 2020-09-19 RX ORDER — SULFAMETHOXAZOLE/TRIMETHOPRIM 800-160 MG
1 TABLET ORAL 2 TIMES DAILY
Qty: 14 TABLET | Refills: 0 | Status: SHIPPED | OUTPATIENT
Start: 2020-09-19 | End: 2020-09-26

## 2020-09-19 RX ADMIN — CLOSTRIDIUM TETANI TOXOID ANTIGEN (FORMALDEHYDE INACTIVATED), CORYNEBACTERIUM DIPHTHERIAE TOXOID ANTIGEN (FORMALDEHYDE INACTIVATED), BORDETELLA PERTUSSIS TOXOID ANTIGEN (GLUTARALDEHYDE INACTIVATED), BORDETELLA PERTUSSIS FILAMENTOUS HEMAGGLUTININ ANTIGEN (FORMALDEHYDE INACTIVATED), BORDETELLA PERTUSSIS PERTACTIN ANTIGEN, AND BORDETELLA PERTUSSIS FIMBRIAE 2/3 ANTIGEN 0.5 ML: 5; 2; 2.5; 5; 3; 5 INJECTION, SUSPENSION INTRAMUSCULAR at 15:22

## 2020-09-19 RX ADMIN — CEFTRIAXONE 1 G: 1 INJECTION, POWDER, FOR SOLUTION INTRAMUSCULAR; INTRAVENOUS at 15:22

## 2020-09-19 ASSESSMENT — ENCOUNTER SYMPTOMS
SHORTNESS OF BREATH: 1
NAUSEA: 0
DIAPHORESIS: 1
DIZZINESS: 0
HEADACHES: 0
ACTIVITY CHANGE: 1
LIGHT-HEADEDNESS: 1
FEVER: 0
VOMITING: 0
COLOR CHANGE: 1
WOUND: 1
CHILLS: 1

## 2020-09-19 NOTE — DISCHARGE INSTRUCTIONS
Apply bacitracin and twice do daily dressing changes and when soiled for the next 48 hours. You may shower but do not saturate the wound. If you have increased pain, redness at wound site, fevers, or abnormal drainage (purulent/pus) you need to see your primary care provider or return to Urgent Care/ER immediately. Acetaminophen/tylenol  or ibuprofen for pain. Complete all antibiotics even if feeling better.  Take antibiotics with food unless instructed otherwise. Yogurt or probiotics may help decrease stomach upset and diarrhea.    Keep affected extremity elevated as much as possible for next 24 - 48 hours. Ice to affected area 20 minutes every hour as needed for comfort. After 48 hours you can apply heat. Ibuprofen 600 to 800 mg (3 - 4 tabs of over the counter med) every six to eight hours as needed;not to exceed maximum amount of 3200 mg in 24 hours.Tylenol 650 to 1000 mg every four to six hours as needed (not to exceed more than 4000 mg in a 24 hour period). May use interchangeably. Suggest medicating around the clock for the next 24-48 hours.     Return to ER or call 911 if symptoms worsen in the next 24 to 48 hours.

## 2020-09-19 NOTE — ED TRIAGE NOTES
Pt is here with c/o possible spider bit to left hand pointer finger   Ongoing 4-5 days  Drainage onset today   Pt reports drainage dark brown and green   Pt denies any fevers or feeling feverish

## 2020-09-19 NOTE — ED PROVIDER NOTES
History     Chief Complaint   Patient presents with     Wound Check     left index finger red, swollen for five days. started oozing today     HPI  Patel Anderson is a 40 year old male who presents with left index finger redness, swelling, and tenderness that started two days ago. The pain is radiating up into his left arm. This is accompanied with chills and sweating, increased shortness of breath, light headedness, and blurry vision. 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 His wife dug a small black piece of soft material out of his finger. He is know wondering if his wound might be related to a spider bite. He has been soaking his finger in Epsom salt and water and took acetaminophen last evening with little relief. Right hand dominant. Smoker. Last tetanus was 2008. Denies fevers, nausea, vomiting, and diarrhea.    Allergies:  No Known Allergies    Problem List:    There are no active problems to display for this patient.       Past Medical History:    No past medical history on file.    Past Surgical History:    Past Surgical History:   Procedure Laterality Date     COLONOSCOPY  01/04/2018       Family History:    No family history on file.    Social History:  Marital Status:   [2]  Social History     Tobacco Use     Smoking status: Not on file   Substance Use Topics     Alcohol use: Not on file     Drug use: Not on file        Medications:    sulfamethoxazole-trimethoprim (BACTRIM DS) 800-160 MG tablet  OMEPRAZOLE PO          Review of Systems   Constitutional: Positive for activity change, chills and diaphoresis. Negative for fever.   Eyes: Positive for visual disturbance (blurred).   Respiratory: Positive for shortness of breath (today).    Gastrointestinal: Negative for nausea and vomiting.   Musculoskeletal:        Left index finger wound   Skin: Positive for color change and wound.   Neurological: Positive for light-headedness. Negative for dizziness and  headaches.       Physical Exam   BP: 120/94  Pulse: 108  Temp: 98.6  F (37  C)  Resp: 16  SpO2: 98 %      Physical Exam  Vitals signs and nursing note reviewed.   Constitutional:       General: He is in acute distress.   Cardiovascular:      Rate and Rhythm: Tachycardia present.   Pulmonary:      Effort: Pulmonary effort is normal.   Musculoskeletal:         General: Swelling and tenderness present.      Left hand: He exhibits decreased range of motion, tenderness and swelling. He exhibits normal capillary refill. Decreased strength noted. He exhibits thumb/finger opposition.        Hands:       Comments: Poor left thumb to index finger opposition.   Region of tissue over first phalange, of left index finger is swollen, erythematous, and very painful to touch.  Two small blisters in center of a darker, middle region of wound has purulent drainage underneath. Poor left finger to thumb opposition left hand. Purulent drainage from wound. Left radial pulse 3+.   Skin:     General: Skin is warm and dry.      Capillary Refill: Capillary refill takes less than 2 seconds.      Findings: Erythema present.   Neurological:      Mental Status: He is alert and oriented to person, place, and time.   Psychiatric:         Behavior: Behavior normal.         ED Course        [facility]    PROCEDURE: -Incision/Drainage    Date/Time: 9/19/2020 7:29 PM  Performed by: Macey Howard CNP  Authorized by: Macey Howard CNP       LOCATION:      Type:  Abscess    Size:  0.5 cm    Location:  Upper extremity    Upper extremity location:  Finger    Finger location:  L index finger    PRE-PROCEDURE DETAILS:     Skin preparation:  Chloraprep    ANESTHESIA (see MAR for exact dosages):     Anesthesia method:  Local infiltration    Local anesthetic:  Lidocaine 1% WITH epi (2 ml)    PROCEDURE TYPE:     Complexity:  Simple    PROCEDURE DETAILS:     Needle aspiration: no      Incision types:  Single straight and stab  "incision    Incision depth:  Dermal    Scalpel blade:  11    Wound management:  Probed and deloculated and irrigated with saline    Drainage:  Purulent    Drainage amount:  Moderate    Wound treatment:  Wound left open and drain placed    Packing materials:  1/2 in gauze (1/2 inch)    Amount 1/2\":  1/2 inch  Post-procedure details:     PROCEDURE   Patient Tolerance:  Patient tolerated the procedure well with no immediate complications  Describe Procedure: Consent obtained. Area injected with 2 ml of 2% lidocaine without epi. Cleaned with chloraprep. 1/4th inch stab wound made with #11 scalpel and moderate amount of thick purulent drainage cultured. Wound probed and deloculated. 6 mm deep wound was irrigated with 10 ml of NS. 1/2 inch of 1/2 inch packing placed. CMS intact before and after procedure. Dressing applied.             No results found for this or any previous visit (from the past 24 hour(s)).    Medications   cefTRIAXone (ROCEPHIN) injection 1 g (1 g Intramuscular Given 9/19/20 1522)   Tdap (tetanus-diphtheria-acell pertussis) (ADACEL) injection 0.5 mL (0.5 mLs Intramuscular Given 9/19/20 1522)       Assessments & Plan (with Medical Decision Making)     I have reviewed the nursing notes.    I have reviewed the findings, diagnosis, plan and need for follow up with the patient.  (L02.512) Abscess of finger of left hand  Comment: 40 year old male who presents with left index finger redness, swelling, and tenderness that started two days ago. The pain is radiating up into his left arm. This is accompanied with chills and sweating, increased shortness of breath, light headedness, and blurry vision. 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 His wife dug a small black piece of soft material out of his finger. He is know wondering if his wound might be related to a spider bite. He has been soaking his finger in Epsom salt and water and took acetaminophen last evening with " little relief. Right hand dominant. Smoker. Last tetanus was 2008. Denies fevers, nausea, vomiting, and diarrhea.    Assessment:  Region of tissue over first phalange, of left index finger, is swollen, erythematous, and very painful to touch.  Two small blisters in center of a darker, middle region of wound has purulent drainage underneath. Poor left finger to thumb opposition left hand. Purulent drainage from wound. Left radial pulse 3+. Poor left thumb to index finger opposition.    See procedure note.    Plan: 1 gm ceftriaxone given IM. Bactrim bid for seven days. Education provided and discussed for this/these medication and for I&D abscess.   Apply bacitracin and twice do daily dressing changes and when soiled for the next 48 hours. You may shower but do not saturate the wound. If you have increased pain, redness at wound site, fevers, or abnormal drainage (purulent/pus) you need to see your primary care provider or return to Urgent Care/ER immediately. Acetaminophen/tylenol  or ibuprofen for pain. Complete all antibiotics even if feeling better.  Take antibiotics with food unless instructed otherwise. Yogurt or probiotics may help decrease stomach upset and diarrhea.    Keep affected extremity elevated as much as possible for next 24 - 48 hours. Ice to affected area 20 minutes every hour as needed for comfort. After 48 hours you can apply heat. Ibuprofen 600 to 800 mg (3 - 4 tabs of over the counter med) every six to eight hours as needed;not to exceed maximum amount of 3200 mg in 24 hours.Tylenol 650 to 1000 mg every four to six hours as needed (not to exceed more than 4000 mg in a 24 hour period). May use interchangeably. Suggest medicating around the clock for the next 24-48 hours.     Return to ER or call 911 if symptoms worsen in the next 24 to 48 hours.   These discharge instructions and medications were reviewed with him and understanding verbalized.    Discharge Medication List as of 9/19/2020  3:44 PM       START taking these medications    Details   sulfamethoxazole-trimethoprim (BACTRIM DS) 800-160 MG tablet Take 1 tablet by mouth 2 times daily for 7 days, Disp-14 tablet,R-0, E-Prescribe             Final diagnoses:   Abscess of finger of left hand       9/19/2020   [facility]       Macey Howard, RODRIGO  09/19/20 1944       Macey Howadr CNP  09/19/20 2000

## 2020-09-19 NOTE — ED AVS SNAPSHOT
[facility]  [address]  [place] [address]  Phone:  [phone]                                    Patel Anderson   MRN: [id number]    Department:  [facility]   Date of Visit:  9/19/2020           After Visit Summary Signature Page    I have received my discharge instructions, and my questions have been answered. I have discussed any challenges I see with this plan with the nurse or doctor.    ..........................................................................................................................................  Patient/Patient Representative Signature      ..........................................................................................................................................  Patient Representative Print Name and Relationship to Patient    ..................................................               ................................................  Date                                   Time    ..........................................................................................................................................  Reviewed by Signature/Title    ...................................................              ..............................................  Date                                               Time          22EPIC Rev 08/18

## 2020-09-20 LAB
GRAM STN SPEC: ABNORMAL
SPECIMEN SOURCE: ABNORMAL

## 2020-09-21 LAB
BACTERIA SPEC CULT: ABNORMAL
BACTERIA SPEC CULT: ABNORMAL
SPECIMEN SOURCE: ABNORMAL

## 2020-12-20 ENCOUNTER — HEALTH MAINTENANCE LETTER (OUTPATIENT)
Age: 40
End: 2020-12-20

## 2021-04-18 ENCOUNTER — HEALTH MAINTENANCE LETTER (OUTPATIENT)
Age: 41
End: 2021-04-18

## 2021-10-03 ENCOUNTER — HEALTH MAINTENANCE LETTER (OUTPATIENT)
Age: 41
End: 2021-10-03

## 2022-05-14 ENCOUNTER — HEALTH MAINTENANCE LETTER (OUTPATIENT)
Age: 42
End: 2022-05-14

## 2022-09-04 ENCOUNTER — HEALTH MAINTENANCE LETTER (OUTPATIENT)
Age: 42
End: 2022-09-04

## 2023-06-03 ENCOUNTER — HEALTH MAINTENANCE LETTER (OUTPATIENT)
Age: 43
End: 2023-06-03